# Patient Record
Sex: FEMALE
[De-identification: names, ages, dates, MRNs, and addresses within clinical notes are randomized per-mention and may not be internally consistent; named-entity substitution may affect disease eponyms.]

---

## 2023-10-09 ENCOUNTER — APPOINTMENT (OUTPATIENT)
Dept: ORTHOPEDIC SURGERY | Facility: CLINIC | Age: 53
End: 2023-10-09
Payer: COMMERCIAL

## 2023-10-09 PROBLEM — Z00.00 ENCOUNTER FOR PREVENTIVE HEALTH EXAMINATION: Status: ACTIVE | Noted: 2023-10-09

## 2023-10-09 PROCEDURE — 99204 OFFICE O/P NEW MOD 45 MIN: CPT | Mod: 25

## 2023-10-09 PROCEDURE — 20611 DRAIN/INJ JOINT/BURSA W/US: CPT | Mod: RT

## 2023-10-09 PROCEDURE — 73030 X-RAY EXAM OF SHOULDER: CPT | Mod: RT

## 2024-01-19 ENCOUNTER — APPOINTMENT (OUTPATIENT)
Dept: ORTHOPEDIC SURGERY | Facility: CLINIC | Age: 54
End: 2024-01-19
Payer: COMMERCIAL

## 2024-01-19 PROCEDURE — 20611 DRAIN/INJ JOINT/BURSA W/US: CPT | Mod: RT

## 2024-01-19 PROCEDURE — 99213 OFFICE O/P EST LOW 20 MIN: CPT | Mod: 25

## 2024-01-19 RX ORDER — TRAMADOL HYDROCHLORIDE 50 MG/1
50 TABLET, COATED ORAL
Qty: 30 | Refills: 0 | Status: ACTIVE | COMMUNITY
Start: 2023-10-09 | End: 1900-01-01

## 2024-01-19 NOTE — DISCUSSION/SUMMARY
[de-identified] : HAD 4 DAYS SIDE EFFECTS FROM [facility]  TODAY - BARBOTAGE 5MM CALCIFICATION DEEP IN TENDON  AND KETOROLAC IN SA SPACE   PT 2 X 4-6 WEEKS  MRI IF NO RELIEF  CONSIDER ARTHROSCOPIC TREATMENT

## 2024-01-19 NOTE — PROCEDURE
[de-identified] : ULTRASOUND GUIDED BARBOTAGE OF ROTATOR CUFF CALCIFICATION   Imaging studies have revealed a calcification in the rotator cuff tendon. Patient's had increasing pain increasing limitation with no relief from conservative management to date. Patient elects to proceed with ultrasound guided barbotaged of rotator cuff calcification.  This benefits discussed with patient. Risks are limited but include infection and blood loss localized pain increasing painful symptoms and limitation  The shoulder was sterilely prepped. Ethyl chloride topical anesthetic used before administration of 1% lidocaine local anesthetic at the injection site.  Calcification was identified with the use of ultrasound. Direct path to the calcification identified and after sterile prep and local anesthetic injection, an 18-gauge needle was inserted into the calcification utilizing ultrasound guidance by the most direct approach. One percent lidocaine was then used to BARBOTAGE the calcification in attempt to remove as much of the calcified material as possible.  A mixture of triamcinolone and local anesthetic was then administered in thesubacromial space  just superficial to the calcification utilizing ultrasound guidance.  Band-Aid applied, cold pack applied, analgesics recommended  Avoid heavy use of the shoulder for 48-72 hours  YIELD:   PARTICULATE CC CALCIUM INJECTATE: KETOROLAC 60mg  LOT:[id number] EXPIRATION:03 / 2024

## 2024-01-19 NOTE — HISTORY OF PRESENT ILLNESS
[de-identified] : RIGHT SHOULDER PAIN  FLARED UP  10/09/2023 PREVIOUS CORRTISONE INJ-HELPFUL FOR 6  WEEKS  FLARED UP NOVEMBER 2023 PT DID AT HOME EXERCISES      PREVIOUS HPI LOCATION: RIGHT SHOULDER PAIN STARTED ORIGINALLY 10 YEARS AGO IMPROVED  DURATION:PAIN FLARED UP OCT 2022 AFTER RETURNING TO WORK KEYBOARD AND MOUSE DISLOCATION RIGHT SHOULDER AS CHILD  QUALITY: SHARP, THROBBING RADIATING PAIN DOWN ELBOW   INTERMITTENT PAIN LEVEL: 8/10  TREATMENTS: IBU, REST  AGGRAVATING FACTORS: OVER HEAD LIFTING, REACHING EBHIND  ASSOCIATED CLICKING PREVIOUS PHYSICAL THERAPY IN THE PAST

## 2024-01-19 NOTE — PHYSICAL EXAM
[de-identified] : PHYSICAL EXAM  RIGHT  SHOULDER    NORMAL POSTURE AROM 130 / 130 / 90 / 30 TENDER: SA REGION lateral   SPECIAL TESTING : HOWELL - POSITIVE  BONIFACIO - POSITIVE  SPEED TEST - POSITIVE  HAN - NEGATIVE  APPREHENSION AND SUPPRESSION - NEGATIVE   RC STRENGTH TESTING  SS:  5/5 SUB 5/5 IS     5/5 BICEPS  5/5  SENSATION  - GROSSLY INTACT

## 2024-02-15 ENCOUNTER — APPOINTMENT (OUTPATIENT)
Dept: ORTHOPEDIC SURGERY | Facility: CLINIC | Age: 54
End: 2024-02-15
Payer: COMMERCIAL

## 2024-02-15 DIAGNOSIS — M75.31 CALCIFIC TENDINITIS OF RIGHT SHOULDER: ICD-10-CM

## 2024-02-15 PROCEDURE — 99213 OFFICE O/P EST LOW 20 MIN: CPT

## 2024-02-15 NOTE — HISTORY OF PRESENT ILLNESS
[de-identified] : RIGHT SHOULDER PAIN  FLARED UP  PAIN LEVEL: 8/10 PREVIOUS CORTISONE INJECTION- NO RELIEF  PT IS GOING TO PHYSCIAL THERPAY 2X WEEK-PAINFUL       PREVIOUS HPI LOCATION: RIGHT SHOULDER PAIN STARTED ORIGINALLY 10 YEARS AGO IMPROVED  DURATION:PAIN FLARED UP OCT 2022 AFTER RETURNING TO WORK KEYBOARD AND MOUSE DISLOCATION RIGHT SHOULDER AS CHILD  QUALITY: SHARP, THROBBING RADIATING PAIN DOWN ELBOW   INTERMITTENT PAIN LEVEL: 8/10  TREATMENTS: IBU, REST  AGGRAVATING FACTORS: OVER HEAD LIFTING, REACHING EBHIND  ASSOCIATED CLICKING PREVIOUS PHYSICAL THERAPY IN THE PAST  10/09/2023 PREVIOUS CORRTISONE INJ-HELPFUL FOR 6  WEEKS  FLARED UP NOVEMBER 2023 PT DID AT HOME EXERCISES

## 2024-02-15 NOTE — PHYSICAL EXAM
[de-identified] : PHYSICAL EXAM  RIGHT  SHOULDER    NORMAL POSTURE AROM 130 / 130 / 90 / 30 TENDER: SA REGION lateral   SPECIAL TESTING : HOWELL - POSITIVE  BONIFACIO - POSITIVE  SPEED TEST - POSITIVE  HAN - NEGATIVE  APPREHENSION AND SUPPRESSION - NEGATIVE   RC STRENGTH TESTING  SS:  5/5 SUB 5/5 IS     5/5 BICEPS  5/5  SENSATION  - GROSSLY INTACT

## 2024-03-20 ENCOUNTER — APPOINTMENT (OUTPATIENT)
Dept: ORTHOPEDIC SURGERY | Facility: CLINIC | Age: 54
End: 2024-03-20
Payer: COMMERCIAL

## 2024-03-20 VITALS — BODY MASS INDEX: 20.49 KG/M2 | WEIGHT: 120 LBS | HEIGHT: 64 IN

## 2024-03-20 DIAGNOSIS — M75.111 INCOMPLETE ROTATOR CUFF TEAR OR RUPTURE OF RIGHT SHOULDER, NOT SPECIFIED AS TRAUMATIC: ICD-10-CM

## 2024-03-20 DIAGNOSIS — M75.41 IMPINGEMENT SYNDROME OF RIGHT SHOULDER: ICD-10-CM

## 2024-03-20 PROCEDURE — 99213 OFFICE O/P EST LOW 20 MIN: CPT | Mod: 25

## 2024-03-20 PROCEDURE — 20611 DRAIN/INJ JOINT/BURSA W/US: CPT | Mod: RT

## 2024-03-20 NOTE — PHYSICAL EXAM
[de-identified] : PHYSICAL EXAM  RIGHT  SHOULDER    NORMAL POSTURE AROM 130 / 130 / 90 / 30 TENDER: SA REGION lateral   SPECIAL TESTING : HOWELL - POSITIVE  BONIFACIO - POSITIVE  SPEED TEST - POSITIVE  HAN - NEGATIVE  APPREHENSION AND SUPPRESSION - NEGATIVE   RC STRENGTH TESTING  SS:  5/5 SUB 5/5 IS     5/5 BICEPS  5/5  SENSATION  - GROSSLY INTACT   [de-identified] : ate of Exam: 03-   EXAM:  MRI RIGHT SHOULDER WITHOUT CONTRAST   IMPRESSION:    1. Mildly thickened coracoacromial ligament, small subacromial enthesophyte. 2. 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 The calcium deposit appears to be involuting and breaking through the bursal surface of the tendon, causing moderate calcific tendinitis/bursitis. 4. Study negative for full-thickness rotator cuff tear. The bursal surface of the infraspinatus tendon is ragged in contour. The supraspinatus tendon is relatively normal. 5. Study negative for labral tear.

## 2024-03-20 NOTE — DISCUSSION/SUMMARY
[de-identified] : RIGHT SHODULER IMPINGEMENT WITH ACROMIAL SPUR BURSAL SIDED PARTIAL TEAR   AND CALCIFICATION   PLAN  BRIDGETT, REPAIR OR DEBRIDE PARTIAL TEAR , EXCISION CALCIFICATION

## 2024-03-20 NOTE — HISTORY OF PRESENT ILLNESS
[de-identified] : RIGHT SHOULDER PAIN  FOLLOW UP  MRI RESULTS TODAY    PREVIOUS HPI LOCATION: RIGHT SHOULDER PAIN STARTED ORIGINALLY 10 YEARS AGO IMPROVED  DURATION:PAIN FLARED UP OCT 2022 AFTER RETURNING TO WORK KEYBOARD AND MOUSE DISLOCATION RIGHT SHOULDER AS CHILD  QUALITY: SHARP, THROBBING RADIATING PAIN DOWN ELBOW   INTERMITTENT PAIN LEVEL: 8/10  TREATMENTS: IBU, REST  AGGRAVATING FACTORS: OVER HEAD LIFTING, REACHING EBHIND  ASSOCIATED CLICKING PREVIOUS PHYSICAL THERAPY IN THE PAST  10/09/2023 PREVIOUS CORRTISONE INJ-HELPFUL FOR 6  WEEKS  FLARED UP NOVEMBER 2023 PREVIOUS CORTISONE INJECTION- NO RELIEF  PT IS GOING TO PHYSCIAL THERPAY 2X WEEK-PAINFUL

## 2024-03-27 ENCOUNTER — APPOINTMENT (OUTPATIENT)
Dept: ORTHOPEDIC SURGERY | Facility: CLINIC | Age: 54
End: 2024-03-27

## 2024-05-22 ENCOUNTER — APPOINTMENT (OUTPATIENT)
Dept: ORTHOPEDIC SURGERY | Facility: CLINIC | Age: 54
End: 2024-05-22